# Patient Record
Sex: FEMALE | Race: WHITE | NOT HISPANIC OR LATINO | Employment: STUDENT | URBAN - METROPOLITAN AREA
[De-identification: names, ages, dates, MRNs, and addresses within clinical notes are randomized per-mention and may not be internally consistent; named-entity substitution may affect disease eponyms.]

---
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
## 2019-04-01 ENCOUNTER — TRANSCRIBE ORDERS (OUTPATIENT)
Dept: ADMINISTRATIVE | Age: 19
End: 2019-04-01

## 2019-04-01 ENCOUNTER — APPOINTMENT (OUTPATIENT)
Dept: LAB | Age: 19
End: 2019-04-01
Payer: COMMERCIAL

## 2019-04-01 DIAGNOSIS — E06.3 CHRONIC LYMPHOCYTIC THYROIDITIS: Primary | ICD-10-CM

## 2019-04-01 DIAGNOSIS — E06.3 CHRONIC LYMPHOCYTIC THYROIDITIS: ICD-10-CM

## 2019-04-01 LAB — TSH SERPL DL<=0.05 MIU/L-ACNC: 1.83 UIU/ML (ref 0.46–3.98)

## 2019-04-01 PROCEDURE — 86376 MICROSOMAL ANTIBODY EACH: CPT

## 2019-04-01 PROCEDURE — 36415 COLL VENOUS BLD VENIPUNCTURE: CPT

## 2019-04-01 PROCEDURE — 84443 ASSAY THYROID STIM HORMONE: CPT

## 2019-04-02 LAB — THYROPEROXIDASE AB SERPL-ACNC: 44 IU/ML (ref 0–34)

## 2020-05-27 ENCOUNTER — APPOINTMENT (OUTPATIENT)
Dept: LAB | Age: 20
End: 2020-05-27
Payer: COMMERCIAL

## 2020-05-27 ENCOUNTER — TRANSCRIBE ORDERS (OUTPATIENT)
Dept: ADMINISTRATIVE | Age: 20
End: 2020-05-27

## 2020-05-27 DIAGNOSIS — E55.9 VITAMIN D DEFICIENCY DISEASE: ICD-10-CM

## 2020-05-27 DIAGNOSIS — E78.5 HYPERLIPIDEMIA, UNSPECIFIED HYPERLIPIDEMIA TYPE: ICD-10-CM

## 2020-05-27 DIAGNOSIS — E64.9 SEQUELA OF NUTRITIONAL DEFICIENCY: ICD-10-CM

## 2020-05-27 DIAGNOSIS — E64.9 SEQUELA OF NUTRITIONAL DEFICIENCY: Primary | ICD-10-CM

## 2020-05-27 LAB
25(OH)D3 SERPL-MCNC: 24.2 NG/ML (ref 30–100)
ALBUMIN SERPL BCP-MCNC: 4.1 G/DL (ref 3.5–5)
ALP SERPL-CCNC: 85 U/L (ref 46–116)
ALT SERPL W P-5'-P-CCNC: 23 U/L (ref 12–78)
ANION GAP SERPL CALCULATED.3IONS-SCNC: 5 MMOL/L (ref 4–13)
AST SERPL W P-5'-P-CCNC: 15 U/L (ref 5–45)
BILIRUB SERPL-MCNC: 0.48 MG/DL (ref 0.2–1)
BUN SERPL-MCNC: 9 MG/DL (ref 5–25)
CALCIUM SERPL-MCNC: 9.5 MG/DL (ref 8.3–10.1)
CHLORIDE SERPL-SCNC: 105 MMOL/L (ref 100–108)
CHOLEST SERPL-MCNC: 191 MG/DL (ref 50–200)
CO2 SERPL-SCNC: 25 MMOL/L (ref 21–32)
CREAT SERPL-MCNC: 0.76 MG/DL (ref 0.6–1.3)
ERYTHROCYTE [DISTWIDTH] IN BLOOD BY AUTOMATED COUNT: 12.3 % (ref 11.6–15.1)
EST. AVERAGE GLUCOSE BLD GHB EST-MCNC: 105 MG/DL
FERRITIN SERPL-MCNC: 17 NG/ML (ref 8–388)
GFR SERPL CREATININE-BSD FRML MDRD: 112 ML/MIN/1.73SQ M
GLUCOSE P FAST SERPL-MCNC: 88 MG/DL (ref 65–99)
HBA1C MFR BLD: 5.3 %
HCT VFR BLD AUTO: 41.6 % (ref 34.8–46.1)
HDLC SERPL-MCNC: 54 MG/DL
HGB BLD-MCNC: 13.7 G/DL (ref 11.5–15.4)
LDLC SERPL CALC-MCNC: 127 MG/DL (ref 0–100)
MCH RBC QN AUTO: 31.9 PG (ref 26.8–34.3)
MCHC RBC AUTO-ENTMCNC: 32.9 G/DL (ref 31.4–37.4)
MCV RBC AUTO: 97 FL (ref 82–98)
NONHDLC SERPL-MCNC: 137 MG/DL
PLATELET # BLD AUTO: 302 THOUSANDS/UL (ref 149–390)
PMV BLD AUTO: 11.2 FL (ref 8.9–12.7)
POTASSIUM SERPL-SCNC: 4 MMOL/L (ref 3.5–5.3)
PROT SERPL-MCNC: 8.2 G/DL (ref 6.4–8.2)
RBC # BLD AUTO: 4.29 MILLION/UL (ref 3.81–5.12)
SODIUM SERPL-SCNC: 135 MMOL/L (ref 136–145)
TRIGL SERPL-MCNC: 51 MG/DL
TSH SERPL DL<=0.05 MIU/L-ACNC: 2.23 UIU/ML (ref 0.36–3.74)
WBC # BLD AUTO: 7.95 THOUSAND/UL (ref 4.31–10.16)

## 2020-05-27 PROCEDURE — 86376 MICROSOMAL ANTIBODY EACH: CPT

## 2020-05-27 PROCEDURE — 36415 COLL VENOUS BLD VENIPUNCTURE: CPT

## 2020-05-27 PROCEDURE — 83036 HEMOGLOBIN GLYCOSYLATED A1C: CPT

## 2020-05-27 PROCEDURE — 80061 LIPID PANEL: CPT

## 2020-05-27 PROCEDURE — 82306 VITAMIN D 25 HYDROXY: CPT

## 2020-05-27 PROCEDURE — 85027 COMPLETE CBC AUTOMATED: CPT

## 2020-05-27 PROCEDURE — 84443 ASSAY THYROID STIM HORMONE: CPT

## 2020-05-27 PROCEDURE — 80053 COMPREHEN METABOLIC PANEL: CPT

## 2020-05-27 PROCEDURE — 82728 ASSAY OF FERRITIN: CPT

## 2020-05-28 LAB — THYROPEROXIDASE AB SERPL-ACNC: 21 IU/ML (ref 0–34)

## 2021-04-13 ENCOUNTER — TRANSCRIBE ORDERS (OUTPATIENT)
Dept: ADMINISTRATIVE | Age: 21
End: 2021-04-13

## 2021-04-13 ENCOUNTER — APPOINTMENT (OUTPATIENT)
Dept: LAB | Age: 21
End: 2021-04-13
Payer: COMMERCIAL

## 2021-04-13 DIAGNOSIS — E04.9 ENLARGEMENT OF THYROID: ICD-10-CM

## 2021-04-13 DIAGNOSIS — R10.84 ABDOMINAL PAIN, GENERALIZED: ICD-10-CM

## 2021-04-13 DIAGNOSIS — D50.9 IRON DEFICIENCY ANEMIA, UNSPECIFIED IRON DEFICIENCY ANEMIA TYPE: ICD-10-CM

## 2021-04-13 DIAGNOSIS — E53.9 VITAMIN B-COMPLEX DEFICIENCY: ICD-10-CM

## 2021-04-13 DIAGNOSIS — D50.9 IRON DEFICIENCY ANEMIA, UNSPECIFIED IRON DEFICIENCY ANEMIA TYPE: Primary | ICD-10-CM

## 2021-04-13 LAB
25(OH)D3 SERPL-MCNC: 18 NG/ML (ref 30–100)
BASOPHILS # BLD AUTO: 0.06 THOUSANDS/ΜL (ref 0–0.1)
BASOPHILS NFR BLD AUTO: 1 % (ref 0–1)
EOSINOPHIL # BLD AUTO: 0.15 THOUSAND/ΜL (ref 0–0.61)
EOSINOPHIL NFR BLD AUTO: 1 % (ref 0–6)
ERYTHROCYTE [DISTWIDTH] IN BLOOD BY AUTOMATED COUNT: 13 % (ref 11.6–15.1)
FERRITIN SERPL-MCNC: 37 NG/ML (ref 8–388)
HCT VFR BLD AUTO: 44 % (ref 34.8–46.1)
HGB BLD-MCNC: 14.8 G/DL (ref 11.5–15.4)
IMM GRANULOCYTES # BLD AUTO: 0.04 THOUSAND/UL (ref 0–0.2)
IMM GRANULOCYTES NFR BLD AUTO: 0 % (ref 0–2)
LYMPHOCYTES # BLD AUTO: 4.22 THOUSANDS/ΜL (ref 0.6–4.47)
LYMPHOCYTES NFR BLD AUTO: 36 % (ref 14–44)
MCH RBC QN AUTO: 32.5 PG (ref 26.8–34.3)
MCHC RBC AUTO-ENTMCNC: 33.6 G/DL (ref 31.4–37.4)
MCV RBC AUTO: 97 FL (ref 82–98)
MONOCYTES # BLD AUTO: 0.64 THOUSAND/ΜL (ref 0.17–1.22)
MONOCYTES NFR BLD AUTO: 6 % (ref 4–12)
NEUTROPHILS # BLD AUTO: 6.53 THOUSANDS/ΜL (ref 1.85–7.62)
NEUTS SEG NFR BLD AUTO: 56 % (ref 43–75)
NRBC BLD AUTO-RTO: 0 /100 WBCS
PLATELET # BLD AUTO: 363 THOUSANDS/UL (ref 149–390)
PMV BLD AUTO: 11.7 FL (ref 8.9–12.7)
RBC # BLD AUTO: 4.55 MILLION/UL (ref 3.81–5.12)
T4 FREE SERPL-MCNC: 1.12 NG/DL (ref 0.76–1.46)
TIBC SERPL-MCNC: 325 UG/DL (ref 250–450)
TSH SERPL DL<=0.05 MIU/L-ACNC: 1.64 UIU/ML (ref 0.36–3.74)
WBC # BLD AUTO: 11.64 THOUSAND/UL (ref 4.31–10.16)

## 2021-04-13 PROCEDURE — 82306 VITAMIN D 25 HYDROXY: CPT

## 2021-04-13 PROCEDURE — 86376 MICROSOMAL ANTIBODY EACH: CPT

## 2021-04-13 PROCEDURE — 84443 ASSAY THYROID STIM HORMONE: CPT

## 2021-04-13 PROCEDURE — 36415 COLL VENOUS BLD VENIPUNCTURE: CPT

## 2021-04-13 PROCEDURE — 82784 ASSAY IGA/IGD/IGG/IGM EACH: CPT

## 2021-04-13 PROCEDURE — 86671 FUNGUS NES ANTIBODY: CPT

## 2021-04-13 PROCEDURE — 85025 COMPLETE CBC W/AUTO DIFF WBC: CPT

## 2021-04-13 PROCEDURE — 83516 IMMUNOASSAY NONANTIBODY: CPT

## 2021-04-13 PROCEDURE — 86255 FLUORESCENT ANTIBODY SCREEN: CPT

## 2021-04-13 PROCEDURE — 82728 ASSAY OF FERRITIN: CPT

## 2021-04-13 PROCEDURE — 84439 ASSAY OF FREE THYROXINE: CPT

## 2021-04-13 PROCEDURE — 83550 IRON BINDING TEST: CPT

## 2021-04-14 DIAGNOSIS — Z23 ENCOUNTER FOR IMMUNIZATION: ICD-10-CM

## 2021-04-15 LAB
ENDOMYSIUM IGA SER QL: NEGATIVE
GLIADIN PEPTIDE IGA SER-ACNC: 8 UNITS (ref 0–19)
GLIADIN PEPTIDE IGG SER-ACNC: 2 UNITS (ref 0–19)
IGA SERPL-MCNC: 334 MG/DL (ref 87–352)
THYROPEROXIDASE AB SERPL-ACNC: 32 IU/ML (ref 0–34)
TTG IGA SER-ACNC: <2 U/ML (ref 0–3)
TTG IGG SER-ACNC: 3 U/ML (ref 0–5)

## 2021-04-16 LAB
BAKER'S YEAST IGG QN IA: 14 UNITS (ref 0–50)
CHITOBIOSIDE IGA SERPL IA-ACNC: 18 UNITS (ref 0–90)
IBD COMMENTS: NORMAL
LAMINARIBIOSIDE IGG SERPL IA-ACNC: 12 UNITS (ref 0–60)
MANNOBIOSIDE IGG SERPL IA-ACNC: 51 UNITS (ref 0–100)
P-ANCA ATYPICAL SER QL IF: NEGATIVE

## 2021-04-23 ENCOUNTER — OFFICE VISIT (OUTPATIENT)
Dept: OBGYN CLINIC | Facility: CLINIC | Age: 21
End: 2021-04-23
Payer: COMMERCIAL

## 2021-04-23 VITALS
SYSTOLIC BLOOD PRESSURE: 130 MMHG | DIASTOLIC BLOOD PRESSURE: 80 MMHG | HEIGHT: 63 IN | WEIGHT: 177 LBS | BODY MASS INDEX: 31.36 KG/M2

## 2021-04-23 DIAGNOSIS — Z01.419 ENCOUNTER FOR WELL WOMAN EXAM: Primary | ICD-10-CM

## 2021-04-23 DIAGNOSIS — Z11.8 SCREENING FOR CHLAMYDIAL DISEASE: ICD-10-CM

## 2021-04-23 DIAGNOSIS — Z11.3 SCREENING FOR STDS (SEXUALLY TRANSMITTED DISEASES): ICD-10-CM

## 2021-04-23 DIAGNOSIS — Z12.39 ENCOUNTER FOR SCREENING BREAST EXAMINATION: ICD-10-CM

## 2021-04-23 DIAGNOSIS — Z01.419 ROUTINE GYNECOLOGICAL EXAMINATION: ICD-10-CM

## 2021-04-23 DIAGNOSIS — Z12.4 SCREENING FOR CERVICAL CANCER: ICD-10-CM

## 2021-04-23 PROCEDURE — S0610 ANNUAL GYNECOLOGICAL EXAMINA: HCPCS | Performed by: PHYSICIAN ASSISTANT

## 2021-04-23 RX ORDER — NAPROXEN SODIUM 550 MG/1
TABLET ORAL
COMMUNITY
Start: 2021-02-11

## 2021-04-23 RX ORDER — PROPRANOLOL HYDROCHLORIDE 40 MG/1
40 TABLET ORAL
COMMUNITY

## 2021-04-23 NOTE — PROGRESS NOTES
The patient is here for a yearly  This is the first time the patient has seen a gyn  The patient is due for a pap smear with Gc/chlamdia testing  The patient has regular periods  No vaginal, bowel, bladder or breast problems

## 2021-04-23 NOTE — PROGRESS NOTES
Assessment/Plan:    No problem-specific Assessment & Plan notes found for this encounter  Diagnoses and all orders for this visit:    Encounter for well woman exam    Encounter for screening breast examination    Screening for cervical cancer    Routine gynecological examination  -     GP PAP/CT/GC (Reflex HPV PLUS when ASC-US)    Screening for STDs (sexually transmitted diseases)  -     GP PAP/CT/GC (Reflex HPV PLUS when ASC-US)    Screening for chlamydial disease  -     GP PAP/CT/GC (Reflex HPV PLUS when ASC-US)    Other orders  -     Cancel: Liquid-based pap, screening ([facility]); Future  -     naproxen sodium (ANAPROX) 550 mg tablet; TAKE WITH RIZATRIPTAN FOR MIGRAINES  -     propranolol (INDERAL) 40 mg tablet; Take 40 mg by mouth daily at bedtime          Subjective:      Patient ID: Jameson Faye is a 24 y o  female  Pt presents as a new patient for her annual exam today--  She has no complaints  She has regular bleeding, no pelvic pain  Bowel and bladder are regular  No breast concerns today  Not interested in [facility]  Lives and works in [place]    [employer] and pap today  Daily mvi, probiotic      The following portions of the patient's history were reviewed and updated as appropriate: allergies, current medications, past family history, past medical history, past social history, past surgical history and problem list     Review of Systems   Constitutional: Negative for chills, fever and unexpected weight change  Gastrointestinal: Negative for abdominal pain, blood in stool, constipation and diarrhea  Genitourinary: Negative  Objective:      /80   Ht 5' 2 6" (1 59 m)   Wt 80 3 kg (177 lb)   LMP 04/01/2021 (Exact Date)   BMI 31 76 kg/m²          Physical Exam  Vitals signs and nursing note reviewed  Constitutional:       Appearance: She is well-developed  HENT:      Head: Normocephalic and atraumatic  Neck:      Musculoskeletal: Normal range of motion     Chest:      Breasts: Right: No inverted nipple, mass, nipple discharge or skin change  Left: No inverted nipple, mass, nipple discharge or skin change  Abdominal:      Palpations: Abdomen is soft  Genitourinary:     Exam position: Supine  Labia:         Right: No rash, tenderness or lesion  Left: No rash, tenderness or lesion  Vagina: Normal       Cervix: No cervical motion tenderness, discharge or friability  Adnexa:         Right: No mass, tenderness or fullness  Left: No mass, tenderness or fullness  Lymphadenopathy:      Lower Body: No right inguinal adenopathy  No left inguinal adenopathy

## 2021-04-27 LAB
DEPRECATED C TRACH RRNA XXX QL PRB: NOT DETECTED
N GONORRHOEA DNA UR QL NAA+PROBE: NOT DETECTED
THIN PREP CVX: NORMAL

## 2022-04-22 ENCOUNTER — APPOINTMENT (RX ONLY)
Dept: URBAN - METROPOLITAN AREA CLINIC 95 | Facility: CLINIC | Age: 22
Setting detail: DERMATOLOGY
End: 2022-04-22

## 2022-04-22 DIAGNOSIS — L21.8 OTHER SEBORRHEIC DERMATITIS: ICD-10-CM | Status: INADEQUATELY CONTROLLED

## 2022-04-22 PROCEDURE — ? PRESCRIPTION

## 2022-04-22 PROCEDURE — ? COUNSELING

## 2022-04-22 PROCEDURE — ? PRESCRIPTION MEDICATION MANAGEMENT

## 2022-04-22 PROCEDURE — ? MEDICATION COUNSELING

## 2022-04-22 PROCEDURE — 99204 OFFICE O/P NEW MOD 45 MIN: CPT

## 2022-04-22 RX ORDER — KETOCONAZOLE 20 MG/ML
SHAMPOO, SUSPENSION TOPICAL TIW
Qty: 120 | Refills: 3 | Status: ERX | COMMUNITY
Start: 2022-04-22

## 2022-04-22 RX ORDER — CLOBETASOL PROPIONATE 0.5 MG/ML
SOLUTION TOPICAL QHS
Qty: 50 | Refills: 0 | Status: ERX | COMMUNITY
Start: 2022-04-22

## 2022-04-22 RX ORDER — KETOCONAZOLE 20 MG/G
CREAM TOPICAL BID
Qty: 30 | Refills: 2 | Status: ERX | COMMUNITY
Start: 2022-04-22

## 2022-04-22 RX ADMIN — KETOCONAZOLE: 20 SHAMPOO, SUSPENSION TOPICAL at 00:00

## 2022-04-22 RX ADMIN — KETOCONAZOLE: 20 CREAM TOPICAL at 00:00

## 2022-04-22 RX ADMIN — CLOBETASOL PROPIONATE: 0.5 SOLUTION TOPICAL at 00:00

## 2022-04-22 ASSESSMENT — LOCATION DETAILED DESCRIPTION DERM
LOCATION DETAILED: MID-OCCIPITAL SCALP
LOCATION DETAILED: MID-FRONTAL SCALP
LOCATION DETAILED: LEFT MEDIAL EYEBROW

## 2022-04-22 ASSESSMENT — LOCATION SIMPLE DESCRIPTION DERM
LOCATION SIMPLE: POSTERIOR SCALP
LOCATION SIMPLE: ANTERIOR SCALP
LOCATION SIMPLE: LEFT EYEBROW

## 2022-04-22 ASSESSMENT — LOCATION ZONE DERM
LOCATION ZONE: FACE
LOCATION ZONE: SCALP

## 2022-04-22 NOTE — PROCEDURE: PRESCRIPTION MEDICATION MANAGEMENT
Render In Strict Bullet Format?: No
Initiate Treatment: Ktoconazole shampoo, clobetasol solution, and ketoconazole cream
Detail Level: Zone
Plan: Follow up in 6 weeks

## 2022-04-29 ENCOUNTER — ANNUAL EXAM (OUTPATIENT)
Dept: OBGYN CLINIC | Facility: CLINIC | Age: 22
End: 2022-04-29
Payer: COMMERCIAL

## 2022-04-29 VITALS
BODY MASS INDEX: 33.68 KG/M2 | WEIGHT: 183 LBS | SYSTOLIC BLOOD PRESSURE: 102 MMHG | DIASTOLIC BLOOD PRESSURE: 62 MMHG | HEIGHT: 62 IN

## 2022-04-29 DIAGNOSIS — L68.0 HIRSUTISM: ICD-10-CM

## 2022-04-29 DIAGNOSIS — G43.909 MIGRAINE WITHOUT STATUS MIGRAINOSUS, NOT INTRACTABLE, UNSPECIFIED MIGRAINE TYPE: ICD-10-CM

## 2022-04-29 DIAGNOSIS — Z01.419 ROUTINE GYNECOLOGICAL EXAMINATION: ICD-10-CM

## 2022-04-29 DIAGNOSIS — Z12.4 CERVICAL CANCER SCREENING: ICD-10-CM

## 2022-04-29 DIAGNOSIS — Z12.39 ENCOUNTER FOR SCREENING BREAST EXAMINATION: ICD-10-CM

## 2022-04-29 DIAGNOSIS — Z01.419 ENCOUNTER FOR WELL WOMAN EXAM: Primary | ICD-10-CM

## 2022-04-29 PROCEDURE — S0612 ANNUAL GYNECOLOGICAL EXAMINA: HCPCS | Performed by: PHYSICIAN ASSISTANT

## 2022-04-29 RX ORDER — KETOCONAZOLE 20 MG/ML
SHAMPOO TOPICAL
COMMUNITY
Start: 2022-04-22

## 2022-04-29 RX ORDER — KETOCONAZOLE 20 MG/G
CREAM TOPICAL
COMMUNITY
Start: 2022-04-22

## 2022-04-29 RX ORDER — CLOBETASOL PROPIONATE 0.46 MG/ML
SOLUTION TOPICAL
COMMUNITY
Start: 2022-04-22

## 2022-04-29 NOTE — PROGRESS NOTES
The patient is here for a yearly  Pap normal Gc/chlamydia neg 4/23/21  The patient has regular periods  No vaginal, bowel, bladder or breast problems

## 2022-05-03 NOTE — PROGRESS NOTES
Assessment/Plan:    No problem-specific Assessment & Plan notes found for this encounter  Diagnoses and all orders for this visit:    Encounter for well woman exam    Routine gynecological examination  -     GP PAP (RFLX HPV Plus whenASC-US)    Encounter for screening breast examination    Cervical cancer screening    Hirsutism    Migraine without status migrainosus, not intractable, unspecified migraine type    Other orders  -     ketoconazole (NIZORAL) 2 % cream; APPLY CREAM TOPICALLY TWICE DAILY TO THE AFFECTED AREA OF FACE FOR 2 WEEKS THEN AS NEEDED FOR PLARES  -     ketoconazole (NIZORAL) 2 % shampoo; LATHER SCALP AND LET SIT 5-10 MINUTES BEFORE RINSING  REPEAT UP TO 3 TIMES WEEKLY  -     clobetasol (TEMOVATE) 0 05 % external solution; APPLY TO SCALP AT NIGHT AND RINSE OUT IN THE MORNING  USE FOR 2 WEEKS, STOP FOR 1 WEEK, REPEAT AS NEEDED  Subjective:      Patient ID: Cuong Wells is a 20 y o  female  Pt presents for her annual exam today--  She has no complaints except some dark hair growth  Cannot be on Bc due to migraines  She has reg bleeding  no pelvic pain  Bowel and bladder are regular  No breast concerns today      pap today  Pt lives in [place], used to work at [employer] :-)      The following portions of the patient's history were reviewed and updated as appropriate: allergies, current medications, past family history, past medical history, past social history, past surgical history and problem list     Review of Systems   Constitutional: Negative for chills, fever and unexpected weight change  Gastrointestinal: Negative for abdominal pain, blood in stool, constipation and diarrhea  Genitourinary: Negative  Objective:      /62   Ht 5' 2" (1 575 m)   Wt 83 kg (183 lb)   LMP 04/12/2022 (Exact Date)   BMI 33 47 kg/m²          Physical Exam  Vitals and nursing note reviewed  Constitutional:       Appearance: She is well-developed     HENT:      Head: Normocephalic and atraumatic  Chest:   Breasts:      Right: No inverted nipple, mass, nipple discharge or skin change  Left: No inverted nipple, mass, nipple discharge or skin change  Abdominal:      Palpations: Abdomen is soft  Genitourinary:     Exam position: Supine  Labia:         Right: No rash, tenderness or lesion  Left: No rash, tenderness or lesion  Vagina: Normal       Cervix: No cervical motion tenderness, discharge or friability  Adnexa:         Right: No mass, tenderness or fullness  Left: No mass, tenderness or fullness  Musculoskeletal:      Cervical back: Normal range of motion  Lymphadenopathy:      Lower Body: No right inguinal adenopathy  No left inguinal adenopathy

## 2022-06-24 ENCOUNTER — APPOINTMENT (RX ONLY)
Dept: URBAN - METROPOLITAN AREA CLINIC 95 | Facility: CLINIC | Age: 22
Setting detail: DERMATOLOGY
End: 2022-06-24

## 2022-06-24 DIAGNOSIS — L21.8 OTHER SEBORRHEIC DERMATITIS: ICD-10-CM | Status: RESOLVING

## 2022-06-24 PROCEDURE — ? MEDICATION COUNSELING

## 2022-06-24 PROCEDURE — ? PRESCRIPTION

## 2022-06-24 PROCEDURE — 99214 OFFICE O/P EST MOD 30 MIN: CPT

## 2022-06-24 PROCEDURE — ? FULL BODY SKIN EXAM - DECLINED

## 2022-06-24 PROCEDURE — ? PRESCRIPTION MEDICATION MANAGEMENT

## 2022-06-24 PROCEDURE — ? COUNSELING

## 2022-06-24 RX ORDER — KETOCONAZOLE 20 MG/ML
1 SHAMPOO, SUSPENSION TOPICAL TIW
Qty: 120 | Refills: 3 | Status: ERX

## 2022-06-24 RX ORDER — LEVOCETIRIZINE DIHYDROCHLORIDE 5 MG
1 TABLET ORAL QD
Qty: 30 | Refills: 2 | Status: ERX | COMMUNITY
Start: 2022-06-24

## 2022-06-24 RX ADMIN — Medication 1: at 00:00

## 2022-06-24 ASSESSMENT — LOCATION ZONE DERM
LOCATION ZONE: FACE
LOCATION ZONE: SCALP

## 2022-06-24 ASSESSMENT — LOCATION SIMPLE DESCRIPTION DERM
LOCATION SIMPLE: ANTERIOR SCALP
LOCATION SIMPLE: LEFT EYEBROW
LOCATION SIMPLE: POSTERIOR SCALP

## 2022-06-24 NOTE — PROCEDURE: PRESCRIPTION MEDICATION MANAGEMENT
Render In Strict Bullet Format?: No
Continue Regimen: Ktoconazole shampoo, clobetasol solution, and ketoconazole cream as needed for flares
Initiate Treatment: Xyzal as needed for itch, Head and shoulders
Detail Level: Zone
Plan: Follow up in 2 months

## 2022-08-12 ENCOUNTER — APPOINTMENT (RX ONLY)
Dept: URBAN - METROPOLITAN AREA CLINIC 95 | Facility: CLINIC | Age: 22
Setting detail: DERMATOLOGY
End: 2022-08-12

## 2022-08-12 DIAGNOSIS — Z71.89 OTHER SPECIFIED COUNSELING: ICD-10-CM

## 2022-08-12 DIAGNOSIS — L21.8 OTHER SEBORRHEIC DERMATITIS: ICD-10-CM

## 2022-08-12 PROCEDURE — ? MEDICATION COUNSELING

## 2022-08-12 PROCEDURE — ? PRESCRIPTION MEDICATION MANAGEMENT

## 2022-08-12 PROCEDURE — ? PRESCRIPTION

## 2022-08-12 PROCEDURE — 99214 OFFICE O/P EST MOD 30 MIN: CPT

## 2022-08-12 PROCEDURE — ? FULL BODY SKIN EXAM - DECLINED

## 2022-08-12 PROCEDURE — ? COUNSELING

## 2022-08-12 RX ORDER — FLUCONAZOLE 200 MG/1
TABLET ORAL
Qty: 2 | Refills: 0 | Status: ERX | COMMUNITY
Start: 2022-08-12

## 2022-08-12 RX ADMIN — FLUCONAZOLE: 200 TABLET ORAL at 00:00

## 2022-08-12 ASSESSMENT — LOCATION DETAILED DESCRIPTION DERM
LOCATION DETAILED: LEFT MEDIAL EYEBROW
LOCATION DETAILED: MID-OCCIPITAL SCALP
LOCATION DETAILED: MID-FRONTAL SCALP

## 2022-08-12 ASSESSMENT — LOCATION SIMPLE DESCRIPTION DERM
LOCATION SIMPLE: LEFT EYEBROW
LOCATION SIMPLE: POSTERIOR SCALP
LOCATION SIMPLE: ANTERIOR SCALP

## 2022-08-12 ASSESSMENT — LOCATION ZONE DERM
LOCATION ZONE: FACE
LOCATION ZONE: SCALP

## 2022-08-12 NOTE — PROCEDURE: MEDICATION COUNSELING
Consent: The risks of atrophy were reviewed with the patient. Lot # (Optional): [id number] Detail Level: Simple Concentration Of Solution Injected (Mg/Ml): 5.0 Total Volume Injected (Ccs- Only Use Numbers And Decimals): 1 Medical Necessity Clause: This procedure was medically necessary because the lesions that were treated were: Expiration Date (Optional): 10/2021 Administered By (Optional): Nohemy Elias PA-C Kenalog Preparation: Kenalog X Size Of Lesion In Cm (Optional): 0 Ndc# For Kenalog Only: Denise Joseph 46 #: [id number] Include Z78.9 (Other Specified Conditions Influencing Health Status) As An Associated Diagnosis?: No Wartpeel Counseling:  I discussed with the patient the risks of Wartpeel including but not limited to erythema, scaling, itching, weeping, crusting, and pain.

## 2022-08-12 NOTE — PROCEDURE: PRESCRIPTION MEDICATION MANAGEMENT
Render In Strict Bullet Format?: No
Continue Regimen: Ketoconazole shampoo and alternate with Head N Shoulders, Ketoconazole topical cream use only as needed for flares.
Initiate Treatment: Fluconazole once a week x 2
Detail Level: Zone
Plan: Follow up in 2 months

## 2022-09-07 NOTE — PROCEDURE: MEDICATION COUNSELING
Pt upset that Coronary Artery Disease is marked yes on the form that was completed 8/30.  She knows she has elevated cholesterol but does not have a diagnosis of CAD, MI or PTCA.  She would like the form redone and can drop a new form off if Kenna will complete it.     Metronidazole Pregnancy And Lactation Text: This medication is Pregnancy Category B and considered safe during pregnancy.  It is also excreted in breast milk.

## 2022-10-11 ENCOUNTER — APPOINTMENT (RX ONLY)
Dept: URBAN - METROPOLITAN AREA CLINIC 352 | Facility: CLINIC | Age: 22
Setting detail: DERMATOLOGY
End: 2022-10-11

## 2022-10-11 DIAGNOSIS — Z71.89 OTHER SPECIFIED COUNSELING: ICD-10-CM

## 2022-10-11 DIAGNOSIS — L21.8 OTHER SEBORRHEIC DERMATITIS: ICD-10-CM | Status: IMPROVED

## 2022-10-11 PROCEDURE — ? MEDICATION COUNSELING

## 2022-10-11 PROCEDURE — ? COUNSELING

## 2022-10-11 PROCEDURE — ? PRESCRIPTION MEDICATION MANAGEMENT

## 2022-10-11 PROCEDURE — ? FULL BODY SKIN EXAM - DECLINED

## 2022-10-11 PROCEDURE — 99214 OFFICE O/P EST MOD 30 MIN: CPT

## 2022-10-11 ASSESSMENT — LOCATION ZONE DERM
LOCATION ZONE: FACE
LOCATION ZONE: SCALP

## 2022-10-11 ASSESSMENT — LOCATION SIMPLE DESCRIPTION DERM
LOCATION SIMPLE: ANTERIOR SCALP
LOCATION SIMPLE: POSTERIOR SCALP
LOCATION SIMPLE: LEFT EYEBROW

## 2022-10-11 ASSESSMENT — LOCATION DETAILED DESCRIPTION DERM
LOCATION DETAILED: LEFT MEDIAL EYEBROW
LOCATION DETAILED: MID-FRONTAL SCALP
LOCATION DETAILED: MID-OCCIPITAL SCALP

## 2022-10-11 NOTE — PROCEDURE: MEDICATION COUNSELING
PATIENT CALLED IN AND STATED SHE NEEDS A REFILL OF pregabalin (Lyrica) 100 MG capsule  PATIENT HAS TWO PILLS LEFT       SENT TO [id number] - [place], [place] - [address] - [phone]  - [phone]   [phone]        PATIENT CALL BACK [phone].    Metronidazole Pregnancy And Lactation Text: This medication is Pregnancy Category B and considered safe during pregnancy.  It is also excreted in breast milk.

## 2022-10-11 NOTE — PROCEDURE: MEDICATION COUNSELING
C/O n/v/d x 3 days, unable to keep down pedialyte.    Minocycline Counseling: Patient advised regarding possible photosensitivity and discoloration of the teeth, skin, lips, tongue and gums.  Patient instructed to avoid sunlight, if possible.  When exposed to sunlight, patients should wear protective clothing, sunglasses, and sunscreen.  The patient was instructed to call the office immediately if the following severe adverse effects occur:  hearing changes, easy bruising/bleeding, severe headache, or vision changes.  The patient verbalized understanding of the proper use and possible adverse effects of minocycline.  All of the patient's questions and concerns were addressed.

## 2022-10-11 NOTE — PROCEDURE: MEDICATION COUNSELING
Patient is due for a physical, breanna x1 with note.  Tia Hart RN    Hydroxyzine Counseling: Patient advised that the medication is sedating and not to drive a car after taking this medication.  Patient informed of potential adverse effects including but not limited to dry mouth, urinary retention, and blurry vision.  The patient verbalized understanding of the proper use and possible adverse effects of hydroxyzine.  All of the patient's questions and concerns were addressed.

## 2022-10-11 NOTE — PROCEDURE: PRESCRIPTION MEDICATION MANAGEMENT
Detail Level: Zone
Continue Regimen: Ketoconazole shampoo
Render In Strict Bullet Format?: No
Plan: Follow up in 3 months

## 2023-11-21 ENCOUNTER — ANNUAL EXAM (OUTPATIENT)
Dept: GYNECOLOGY | Facility: CLINIC | Age: 23
End: 2023-11-21
Payer: COMMERCIAL

## 2023-11-21 ENCOUNTER — APPOINTMENT (OUTPATIENT)
Dept: LAB | Age: 23
End: 2023-11-21
Payer: COMMERCIAL

## 2023-11-21 VITALS
SYSTOLIC BLOOD PRESSURE: 102 MMHG | WEIGHT: 173 LBS | HEIGHT: 62 IN | BODY MASS INDEX: 31.83 KG/M2 | DIASTOLIC BLOOD PRESSURE: 70 MMHG

## 2023-11-21 DIAGNOSIS — E28.2 POLYCYSTIC OVARIES: ICD-10-CM

## 2023-11-21 DIAGNOSIS — Z01.419 ENCOUNTER FOR WELL WOMAN EXAM: Primary | ICD-10-CM

## 2023-11-21 DIAGNOSIS — E55.9 AVITAMINOSIS D: ICD-10-CM

## 2023-11-21 DIAGNOSIS — E78.5 HYPERLIPIDEMIA, UNSPECIFIED HYPERLIPIDEMIA TYPE: ICD-10-CM

## 2023-11-21 DIAGNOSIS — Z12.39 ENCOUNTER FOR SCREENING BREAST EXAMINATION: ICD-10-CM

## 2023-11-21 LAB
25(OH)D3 SERPL-MCNC: 24.1 NG/ML (ref 30–100)
CHOLEST SERPL-MCNC: 172 MG/DL
HDLC SERPL-MCNC: 45 MG/DL
INSULIN SERPL-ACNC: 6.43 UIU/ML
LDLC SERPL CALC-MCNC: 110 MG/DL (ref 0–100)
NONHDLC SERPL-MCNC: 127 MG/DL
TRIGL SERPL-MCNC: 87 MG/DL

## 2023-11-21 PROCEDURE — 82306 VITAMIN D 25 HYDROXY: CPT

## 2023-11-21 PROCEDURE — 83525 ASSAY OF INSULIN: CPT

## 2023-11-21 PROCEDURE — 84402 ASSAY OF FREE TESTOSTERONE: CPT

## 2023-11-21 PROCEDURE — 84403 ASSAY OF TOTAL TESTOSTERONE: CPT

## 2023-11-21 PROCEDURE — 36415 COLL VENOUS BLD VENIPUNCTURE: CPT

## 2023-11-21 PROCEDURE — 80061 LIPID PANEL: CPT

## 2023-11-21 PROCEDURE — S0612 ANNUAL GYNECOLOGICAL EXAMINA: HCPCS | Performed by: PHYSICIAN ASSISTANT

## 2023-11-21 RX ORDER — SELENIUM SULFIDE 22.5 MG/ML
SHAMPOO TOPICAL
COMMUNITY
Start: 2023-11-08

## 2023-11-21 NOTE — PROGRESS NOTES
Assessment/Plan:    No problem-specific Assessment & Plan notes found for this encounter. Diagnoses and all orders for this visit:    Encounter for well woman exam    Encounter for screening breast examination    Other orders  -     Selenium Sulfide 2.25 % SHAM; APPLY TO DRY SCALP AND LET SIT 5-10 MINUTES. LATHER AND RINSE. REPEAT EVERY OTHER DAY ALTERNATING WITH KETOCONAZOLE SHAMPOO FOR 2 WEEKS, THEN 3 TIMES A WEEK FOR MAINTENANCE. ALTERNATE WITH KETOCONAZOLE SHAMPOO. Subjective:      Patient ID: Wendy Santana is a 24 y.o. female. Pt presents for her annual exam today--  She has no gyn complaints  She has mostly regular bleeding  no pelvic pain  Some increase in facial hair  Had BW done this AM  Bowel and bladder are regular  No breast concerns today    No pap today. Daily mvi, probiotic        The following portions of the patient's history were reviewed and updated as appropriate: allergies, current medications, past family history, past medical history, past social history, past surgical history, and problem list.    Review of Systems   Constitutional:  Negative for chills, fever and unexpected weight change. HENT:  Negative for ear pain and sore throat. Eyes:  Negative for pain and visual disturbance. Respiratory:  Negative for cough and shortness of breath. Cardiovascular:  Negative for chest pain and palpitations. Gastrointestinal:  Negative for abdominal pain, blood in stool, constipation, diarrhea and vomiting. Genitourinary: Negative. Negative for dysuria and hematuria. Musculoskeletal:  Negative for arthralgias and back pain. Skin:  Negative for color change and rash. Neurological:  Negative for seizures and syncope. All other systems reviewed and are negative. Objective:      /70   Ht 5' 2" (1.575 m)   Wt 78.5 kg (173 lb)   LMP 11/12/2023 (Exact Date)   BMI 31.64 kg/m²          Physical Exam  Vitals and nursing note reviewed.    Constitutional: Appearance: She is well-developed. HENT:      Head: Normocephalic and atraumatic. Chest:   Breasts:     Right: No inverted nipple, mass, nipple discharge or skin change. Left: No inverted nipple, mass, nipple discharge or skin change. Abdominal:      Palpations: Abdomen is soft. Genitourinary:     Exam position: Supine. Labia:         Right: No rash, tenderness or lesion. Left: No rash, tenderness or lesion. Vagina: Normal.      Cervix: No cervical motion tenderness, discharge or friability. Adnexa:         Right: No mass, tenderness or fullness. Left: No mass, tenderness or fullness. Musculoskeletal:      Cervical back: Normal range of motion. Lymphadenopathy:      Lower Body: No right inguinal adenopathy. No left inguinal adenopathy.

## 2023-11-22 LAB
TESTOST FREE SERPL-MCNC: 8.5 PG/ML (ref 0–4.2)
TESTOST SERPL-MCNC: 38 NG/DL (ref 13–71)
